# Patient Record
Sex: MALE | Race: ASIAN | NOT HISPANIC OR LATINO | Employment: FULL TIME | URBAN - METROPOLITAN AREA
[De-identification: names, ages, dates, MRNs, and addresses within clinical notes are randomized per-mention and may not be internally consistent; named-entity substitution may affect disease eponyms.]

---

## 2017-08-26 ENCOUNTER — OFFICE VISIT (OUTPATIENT)
Dept: URGENT CARE | Facility: PHYSICIAN GROUP | Age: 42
End: 2017-08-26
Payer: OTHER GOVERNMENT

## 2017-08-26 VITALS
WEIGHT: 188 LBS | RESPIRATION RATE: 16 BRPM | TEMPERATURE: 99.4 F | HEIGHT: 68 IN | HEART RATE: 60 BPM | BODY MASS INDEX: 28.49 KG/M2 | OXYGEN SATURATION: 97 % | DIASTOLIC BLOOD PRESSURE: 78 MMHG | SYSTOLIC BLOOD PRESSURE: 130 MMHG

## 2017-08-26 DIAGNOSIS — J30.1 SEASONAL ALLERGIC RHINITIS DUE TO POLLEN: ICD-10-CM

## 2017-08-26 DIAGNOSIS — H57.9 ITCHY EYES: ICD-10-CM

## 2017-08-26 PROCEDURE — 99214 OFFICE O/P EST MOD 30 MIN: CPT | Performed by: PHYSICIAN ASSISTANT

## 2017-08-26 RX ORDER — TRIAMCINOLONE ACETONIDE 40 MG/ML
40 INJECTION, SUSPENSION INTRA-ARTICULAR; INTRAMUSCULAR ONCE
Status: COMPLETED | OUTPATIENT
Start: 2017-08-26 | End: 2017-08-26

## 2017-08-26 RX ORDER — AZELASTINE 1 MG/ML
1 SPRAY, METERED NASAL 2 TIMES DAILY
Qty: 30 ML | Refills: 3 | Status: SHIPPED | OUTPATIENT
Start: 2017-08-26 | End: 2022-06-18

## 2017-08-26 RX ADMIN — TRIAMCINOLONE ACETONIDE 40 MG: 40 INJECTION, SUSPENSION INTRA-ARTICULAR; INTRAMUSCULAR at 12:21

## 2017-08-26 ASSESSMENT — ENCOUNTER SYMPTOMS
COUGH: 0
SINUS PAIN: 0
WHEEZING: 0
RHINORRHEA: 1
SORE THROAT: 0

## 2017-08-26 NOTE — PROGRESS NOTES
"Subjective:      Sami Escalona is a 42 y.o. male who presents with Allergic Rhinitis (poss allergies, runny nose, congestion x2 days)    Pt PMH, SocHx, SurgHx, FamHx, Drug allergies and medications reviewed with pt/EPIC.      Family history reviewed, it is not pertinent to this complaint.           PT states he has had seasonal allergies \"for a long time\" and would like a kenalog injection because it helps keep his allergies under control.  He is a  and is outside most of the day.        URI    This is a new problem. The current episode started 1 to 4 weeks ago. The problem has been waxing and waning. There has been no fever. Associated symptoms include congestion, rhinorrhea and sneezing. Pertinent negatives include no coughing, ear pain, sinus pain, sore throat or wheezing. He has tried nothing for the symptoms. The treatment provided no relief.       Review of Systems   HENT: Positive for congestion, rhinorrhea and sneezing. Negative for ear pain and sore throat.    Respiratory: Negative for cough and wheezing.    Skin: Positive for itching.   Endo/Heme/Allergies: Positive for environmental allergies.   All other systems reviewed and are negative.         Objective:     /78   Pulse 60   Temp 37.4 °C (99.4 °F)   Resp 16   Ht 1.727 m (5' 8\")   Wt 85.3 kg (188 lb)   SpO2 97%   BMI 28.59 kg/m²      Physical Exam   Constitutional: He is oriented to person, place, and time. He appears well-developed and well-nourished. No distress.   HENT:   Head: Normocephalic and atraumatic.   Right Ear: Tympanic membrane normal.   Left Ear: Tympanic membrane normal.   Nose: Rhinorrhea present.   Mouth/Throat: Uvula is midline, oropharynx is clear and moist and mucous membranes are normal.   Eyes: EOM and lids are normal. Pupils are equal, round, and reactive to light. Right conjunctiva is injected. Left conjunctiva is injected.   Neck: Normal range of motion. Neck supple. No JVD present.   Cardiovascular: Normal " rate, regular rhythm and normal heart sounds.    Pulmonary/Chest: Effort normal and breath sounds normal.   Abdominal: Soft.   Musculoskeletal: Normal range of motion.   Lymphadenopathy:     He has no cervical adenopathy.   Neurological: He is alert and oriented to person, place, and time.   Skin: Skin is warm and dry.            Assessment/Plan:     1. Seasonal allergic rhinitis due to pollen  azelastine (ASTELIN) 137 MCG/SPRAY nasal spray    triamcinolone acetonide (KENALOG-40) injection 40 mg   2. Itchy eyes       PT was instructed to begin a daily OTC allergy medication for relief of allergy symptoms.  Ex: allegra, claritin, zyrtec, allerclear, etc.   Pt can also take OTC benadryl at bedtime if symptoms are keeping them awake at night.     PT should follow up with PCP in 1-2 days for re-evaluation if symptoms have not improved.  Discussed red flags and reasons to return to UC or ED.  Pt and/or family verbalized understanding of diagnosis and follow up instructions and was given informational handout on diagnosis.  PT discharged.

## 2017-08-26 NOTE — LETTER
August 26, 2017         Patient: Sami Escalona   YOB: 1975   Date of Visit: 8/26/2017           To Whom it May Concern:    Sami Escalona was seen in my clinic on 8/26/2017. He may return to work on 08/28/2017.    If you have any questions or concerns, please don't hesitate to call.        Sincerely,           Kristen Burleson P.A.-C.  Electronically Signed

## 2022-06-18 ENCOUNTER — OFFICE VISIT (OUTPATIENT)
Dept: URGENT CARE | Facility: CLINIC | Age: 47
End: 2022-06-18
Payer: OTHER GOVERNMENT

## 2022-06-18 VITALS
WEIGHT: 190 LBS | SYSTOLIC BLOOD PRESSURE: 142 MMHG | DIASTOLIC BLOOD PRESSURE: 90 MMHG | OXYGEN SATURATION: 97 % | RESPIRATION RATE: 18 BRPM | HEIGHT: 68 IN | HEART RATE: 57 BPM | TEMPERATURE: 98 F | BODY MASS INDEX: 28.79 KG/M2

## 2022-06-18 DIAGNOSIS — J06.9 VIRAL URI WITH COUGH: ICD-10-CM

## 2022-06-18 PROCEDURE — 99203 OFFICE O/P NEW LOW 30 MIN: CPT | Performed by: FAMILY MEDICINE

## 2022-06-18 RX ORDER — DEXTROMETHORPHAN HYDROBROMIDE AND PROMETHAZINE HYDROCHLORIDE 15; 6.25 MG/5ML; MG/5ML
5 SYRUP ORAL EVERY EVENING
Qty: 118 ML | Refills: 0 | Status: SHIPPED | OUTPATIENT
Start: 2022-06-18

## 2022-06-18 RX ORDER — BENZONATATE 100 MG/1
CAPSULE ORAL
COMMUNITY
Start: 2022-03-22 | End: 2022-06-18

## 2022-06-18 RX ORDER — BENZONATATE 100 MG/1
100 CAPSULE ORAL 3 TIMES DAILY PRN
Qty: 60 CAPSULE | Refills: 0 | Status: SHIPPED | OUTPATIENT
Start: 2022-06-18

## 2022-06-18 ASSESSMENT — ENCOUNTER SYMPTOMS
VOMITING: 0
FEVER: 1

## 2022-06-18 NOTE — PROGRESS NOTES
"Subjective:     Sami Escalona is a 46 y.o. male who presents for Cough (X2days, Cough, fever, scratchy throat, stuffy nose)    HPI  Pt presents for evaluation of an acute problem  Patient with an acute illness for the past 2 days  Having cough, scratchy throat, and congestion  Having dry cough   Has had a few headaches   No N/V/D   Having subjective fevers   Negative for COVID-19 this morning    Review of Systems   Constitutional: Positive for fever.   Gastrointestinal: Negative for vomiting.   Skin: Negative for rash.       PMH:  has no past medical history on file.  MEDS:   Current Outpatient Medications:   •  benzonatate (TESSALON) 100 MG Cap, TAKE 2 CAPSULES BY MOUTH 3 TIMES A DAY FOR 7 DAYS, Disp: , Rfl:   •  ibuprofen (MOTRIN) 200 MG Tab, Take 200 mg by mouth every 6 hours as needed., Disp: , Rfl:   ALLERGIES: No Known Allergies  SURGHX: History reviewed. No pertinent surgical history.  SOCHX:  reports that he quit smoking about 6 years ago. His smoking use included cigarettes. He smoked 0.25 packs per day. He has never used smokeless tobacco. He reports current alcohol use of about 3.0 oz of alcohol per week. He reports that he does not use drugs.     Objective:   BP (!) 142/90 (BP Location: Left arm, Patient Position: Sitting, BP Cuff Size: Large adult)   Pulse (!) 57   Temp 36.7 °C (98 °F) (Temporal)   Resp 18   Ht 1.727 m (5' 8\")   Wt 86.2 kg (190 lb)   SpO2 97%   BMI 28.89 kg/m²     Physical Exam  Constitutional:       General: He is not in acute distress.     Appearance: He is well-developed. He is not diaphoretic.   HENT:      Head: Normocephalic and atraumatic.      Right Ear: Tympanic membrane, ear canal and external ear normal.      Left Ear: Tympanic membrane, ear canal and external ear normal.      Nose: Congestion present.      Mouth/Throat:      Mouth: Mucous membranes are moist.      Pharynx: Oropharynx is clear. No oropharyngeal exudate or posterior oropharyngeal erythema.   Pulmonary:      " Effort: Pulmonary effort is normal.   Musculoskeletal:      Cervical back: Normal range of motion and neck supple. No tenderness.   Lymphadenopathy:      Cervical: No cervical adenopathy.   Neurological:      Mental Status: He is alert.       Assessment/Plan:   Assessment    1. Viral URI with cough  - promethazine-dextromethorphan (PROMETHAZINE-DM) 6.25-15 MG/5ML syrup; Take 5 mL by mouth every evening.  Dispense: 118 mL; Refill: 0  - benzonatate (TESSALON) 100 MG Cap; Take 1 Capsule by mouth 3 times a day as needed for Cough.  Dispense: 60 Capsule; Refill: 0    Patient with viral URI.  Advised antibiotics will not cure this sort of illness.  He has already been tested for COVID-19 and was negative.  Reviewed supportive care measures and given cough medications.  Follow-up if not resolving as expected.

## 2022-06-18 NOTE — LETTER
June 18, 2022    To Whom It May Concern:         This is confirmation that Sami Escalona attended his scheduled appointment with Edgar Kennedy M.D. on 6/18/22.  Please excuse him from work today.  He may return to work 6/21/2022.  Thank you for making accommodations as he recovers.         If you have any questions please do not hesitate to call me at the phone number listed below.    Sincerely,          Edgar Kennedy M.D.  [phone]